# Patient Record
Sex: FEMALE | Race: OTHER | NOT HISPANIC OR LATINO | URBAN - METROPOLITAN AREA
[De-identification: names, ages, dates, MRNs, and addresses within clinical notes are randomized per-mention and may not be internally consistent; named-entity substitution may affect disease eponyms.]

---

## 2023-12-01 ENCOUNTER — EMERGENCY (EMERGENCY)
Facility: HOSPITAL | Age: 28
LOS: 1 days | Discharge: HOME | End: 2023-12-01
Admitting: EMERGENCY MEDICINE
Payer: COMMERCIAL

## 2023-12-01 VITALS
HEART RATE: 63 BPM | SYSTOLIC BLOOD PRESSURE: 111 MMHG | DIASTOLIC BLOOD PRESSURE: 78 MMHG | OXYGEN SATURATION: 98 % | RESPIRATION RATE: 18 BRPM | TEMPERATURE: 98 F

## 2023-12-01 DIAGNOSIS — G44.309 POST-TRAUMATIC HEADACHE, UNSPECIFIED, NOT INTRACTABLE: ICD-10-CM
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
## 2023-12-01 DIAGNOSIS — F07.81 POSTCONCUSSIONAL SYNDROME: ICD-10-CM
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
## 2023-12-01 DIAGNOSIS — S09.90XA UNSPECIFIED INJURY OF HEAD, INITIAL ENCOUNTER: ICD-10-CM
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
## 2023-12-01 PROCEDURE — 99053 MED SERV 10PM-8AM 24 HR FAC: CPT

## 2023-12-01 PROCEDURE — 99283 EMERGENCY DEPT VISIT LOW MDM: CPT

## 2023-12-01 NOTE — ED ADULT TRIAGE NOTE - CHIEF COMPLAINT QUOTE
Walk in pt with complaints of head injury by wooden door. P/s she was standing beside sliding barn style door that must have come off its hinge striking her on the ride side of her head. Small swelling noted. Denies LOC or current meds.

## 2023-12-02 NOTE — ED PROVIDER NOTE - ENMT, MLM
Head- NC, AT but there is TTP rt temporal scalp without breaks in the skin or hematoma. Airway patent, Nasal mucosa clear. Mouth with normal mucosa. Throat has no vesicles, no oropharyngeal exudates and uvula is midline.

## 2023-12-02 NOTE — ED PROVIDER NOTE - CLINICAL SUMMARY MEDICAL DECISION MAKING FREE TEXT BOX
28-year-old female presents over concerns of right-sided headache after door hit her in the head.  Physical exam reassuring, no evidence of head trauma on exam.  Neurologically intact without any signs of neurovascular compromise.  [place] head CT score 0 so therefore negative, discussed risk versus benefit of CAT scan, offered CT head to patient however she declined after shared decision making.  Concussion symptoms/protocol discussed with the patient.  Strict return to ER precautions discussed with the patient regarding any new symptoms or worsening symptoms.  Advise over-the-counter Tylenol/Motrin as needed.  Patient agreeable with discharge plan

## 2023-12-02 NOTE — ED PROVIDER NOTE - NSFOLLOWUPINSTRUCTIONS_ED_ALL_ED_FT
A concussion may occur when the head hits an object, or a moving object strikes the head. A concussion is usually a minor or less severe type of brain injury, which may also be called a traumatic brain injury.    A concussion can affect how the brain works for a while. It may lead to headaches, changes in alertness, or loss of consciousness.    After you go home, follow your health care provider's instructions on how to take care of yourself. Use the information below as a reminder.    A pretty good bump on the head, or a violent collision, can leave you feeling woozy and confused, and with a splitting headache. If it's bad enough, you may even lose consciousness. So, what causes a concussion? Your brain is a delicate organ encased in bone, your skull. When you fall down, suffer violent contact during a sports activity, or hit your head in a car accident, your brain moves but has nowhere to go. Instead, it swirls around inside your head and bumps into your skull. This causes bruising that damages your brain. The classic symptom of a concussion is loss of consciousness. But many people might experience only a brief moment of amnesia or disorientation. Typically, you'll have a headache, feel sleepy, and you may even vomit. Most likely you will not be able to think straight, that is, maybe you can't remember the date or your name. You may see flashing lights and even feel like you've lost time. Sometimes, it may take a day or two after the blow for some symptoms to develop. Your doctor will do a physical exam, checking your pupils, your ability to think, your coordination, and your reflexes. The doctor may want to look for bleeding in your brain, so you may need a CT or MRI scan. You may also have a brain wave test, or EEG. So, how do we treat a concussion? First and foremost, you will need to rest and be watched -- sometimes in the hospital, and sometimes by a parent, friend, or spouse if you're at home. For your headache, you can take acetaminophen. You may need to eat a light diet for a while if you continue to feel sick, or feel like vomiting. You'll want to have someone stay with you for the first 12 to 24 hours after your concussion. It's okay to sleep, but someone should wake you up every few hours and ask you a simple question, such as your name, and then watch you for changes in how you look or act. Obviously, if you were playing sports when you received a concussion, you most likely will need to stop. Sometimes you can't return to a sport for weeks, or longer, especially if your symptoms don't improve. That's because once you've had a concussion, it's easier to get another one, and multiple concussions can lead to long-term brain damage.    What to Expect at Home  Getting better from a concussion takes days to weeks, months or sometimes even longer depending on the severity of the concussion. You may be irritable, have trouble concentrating, or be unable to remember things. You may also have headaches, dizziness, or blurry vision. These problems will likely recover slowly. You may want to get help from family or friends for making important decisions.    When You First Go Home  You may use acetaminophen (Tylenol) for a headache. Do not use aspirin, ibuprofen (Motrin or Advil), naproxen, or other non-steroidal anti-inflammatory drugs. Consult your doctor before taking blood thinners if you have a history of heart problems such as abnormal heart rhythm.    You do not need to stay in bed. Light activity around the home is okay. But avoid exercise, lifting weights, or other heavy activity.    You may want to keep your diet light if you have nausea and vomiting. Drink fluids to stay hydrated.    Have an adult stay with you for the first 12 to 24 hours after you are home from the emergency room.    Going to sleep is OK. Ask your doctor whether, for at least the first 12 hours, someone should wake you up every 2 or 3 hours. They can ask a simple question, such as your name, and then look for any other changes in the way you look or act.  Ask your doctor how long you need to do this.  Do not drink alcohol until you have fully recovered. Alcohol may slow down how quickly you recover and increase your chance of another injury. It can also make it harder to make decisions.    Activity  As long as you have symptoms, avoid sports activities, operating machines, being overly active, doing physical labor. Ask your doctor when you can return to your activities.    If you do sports, a doctor will need to check you before you go back to playing.    Make sure friends, co-workers, and family members know about your recent injury.    Let your family, co-workers, and friends know that you may be more tired, withdrawn, easily upset, or confused. Also tell them that you may have a hard time with tasks that require remembering or concentrating, and may have mild headaches and less tolerance for noise.    Consider asking for more breaks when you return to work.    Talk with your employer about:    Reducing your workload for a while  Not doing activities that may place others in danger  Timing of important projects  Allowing rest times during the day  Having extra time to complete projects  Having others check your work  A doctor should tell you when you can:    Do heavy labor or operate machines  Play contact sports, such as football, hockey, and soccer  Ride a bicycle, motorcycle, or off-road vehicle  Drive a car  Ski, snowboard, skate, skateboard, or do gymnastics or martial arts  Participate in any activity where there is a risk of hitting your head or jolt to the head  When to Call the Doctor  If symptoms do not go away or are not improving after 2 or 3 weeks, talk to your doctor.    Call the doctor sooner if you have:    A stiff neck  Fluid and blood leaking from your nose or ears  A hard time waking up or have become more sleepy  A headache that is getting worse, lasts a long time, or is not relieved by over-the-counter pain relievers  Fever  Vomiting more than 3 times  Problems walking or talking  Changes in speech (slurred, difficult to understand, does not make sense)  Problems thinking straight  Seizures (jerking your arms or legs without control)  Changes in behavior or unusual behavior  Double vision

## 2023-12-02 NOTE — ED PROVIDER NOTE - CRANIAL NERVE AND PUPILLARY EXAM
cranial nerves 2-12 intact/cough reflex intact/corneal reflex intact/central and peripheral vision intact/extra-ocular movements intact/tongue is midline

## 2023-12-02 NOTE — ED PROVIDER NOTE - CARE PLAN
1 Principal Discharge DX:	Head injury  Assessment and plan of treatment:	29 y/o F p/w head injury, likely concussion  Secondary Diagnosis:	Concussion syndrome

## 2023-12-02 NOTE — ED PROVIDER NOTE - PATIENT PORTAL LINK FT
You can access the FollowMyHealth Patient Portal offered by [facility] by registering at the following website: [email or web address]. By joining [facility]’s FollowMyHealth portal, you will also be able to view your health information using other applications (apps) compatible with our system.